# Patient Record
Sex: MALE | Race: WHITE | Employment: FULL TIME | URBAN - METROPOLITAN AREA
[De-identification: names, ages, dates, MRNs, and addresses within clinical notes are randomized per-mention and may not be internally consistent; named-entity substitution may affect disease eponyms.]

---

## 2024-06-07 ENCOUNTER — OFFICE VISIT (OUTPATIENT)
Dept: SURGERY | Age: 69
End: 2024-06-07
Payer: MEDICARE

## 2024-06-07 VITALS
HEART RATE: 62 BPM | WEIGHT: 200 LBS | HEIGHT: 70 IN | BODY MASS INDEX: 28.63 KG/M2 | OXYGEN SATURATION: 90 % | TEMPERATURE: 97.9 F

## 2024-06-07 DIAGNOSIS — D04.5 SQUAMOUS CELL CARCINOMA IN SITU (SCCIS) OF SKIN OF BACK: Primary | ICD-10-CM

## 2024-06-07 PROCEDURE — 1123F ACP DISCUSS/DSCN MKR DOCD: CPT | Performed by: PLASTIC SURGERY

## 2024-06-07 PROCEDURE — 1036F TOBACCO NON-USER: CPT | Performed by: PLASTIC SURGERY

## 2024-06-07 PROCEDURE — G8419 CALC BMI OUT NRM PARAM NOF/U: HCPCS | Performed by: PLASTIC SURGERY

## 2024-06-07 PROCEDURE — 3017F COLORECTAL CA SCREEN DOC REV: CPT | Performed by: PLASTIC SURGERY

## 2024-06-07 PROCEDURE — G8427 DOCREV CUR MEDS BY ELIG CLIN: HCPCS | Performed by: PLASTIC SURGERY

## 2024-06-07 PROCEDURE — 99204 OFFICE O/P NEW MOD 45 MIN: CPT | Performed by: PLASTIC SURGERY

## 2024-06-07 NOTE — PROGRESS NOTES
results found for: \"NA\", \"K\", \"CL\", \"CO2\", \"BUN\", \"LABALBU\", \"CREATININE\", \"CALCIUM\", \"GFRAA\", \"LABGLOM\", \"GLUCOSE\", \"GLU\"    Radiology Review:  No radiology needed at this time  Pathology Review:  Pathology reviewed                 IMPRESSION/RECOMMENDATIONS:        Diagnosis  -) Squamous Cell Cancer of mid back      -The patient was counseled on their pathology results and the need for surgical   intervention.   -We will plan to proceed and have the lesion formely excised with margins in office under local anesthesia.  -The patient will not require frozen sections in the operating room  -The patient will not require pre-op clearance from their PCP.    -Excision of mid back squamous Cell Cancer with possible local tissue rearrangement      -The risks, benefits and options were discussed with the pt. The risks included but not limited to pain, bleeding, infection, heavy scarring, damage to surrounding structures, fluid collections, asymmetry, and need for further procedures. All of His questions were answered to their satisfaction and He agrees to proceed with the procedure.    Photos obtained    Follow up day of procedure.       This document is generated, in part, by voice recognition software and thus  syntax and grammatical errors are possible.    Clark Hermosillo MD  12:26 PM  6/18/2024

## 2024-06-20 ENCOUNTER — PROCEDURE VISIT (OUTPATIENT)
Dept: SURGERY | Age: 69
End: 2024-06-20
Payer: MEDICARE

## 2024-06-20 VITALS
SYSTOLIC BLOOD PRESSURE: 120 MMHG | TEMPERATURE: 98.1 F | DIASTOLIC BLOOD PRESSURE: 76 MMHG | HEART RATE: 87 BPM | RESPIRATION RATE: 20 BRPM | OXYGEN SATURATION: 96 %

## 2024-06-20 DIAGNOSIS — C44.92 SCC (SQUAMOUS CELL CARCINOMA): Primary | ICD-10-CM

## 2024-06-20 PROCEDURE — 12032 INTMD RPR S/A/T/EXT 2.6-7.5: CPT | Performed by: PLASTIC SURGERY

## 2024-06-20 PROCEDURE — 11602 EXC TR-EXT MAL+MARG 1.1-2 CM: CPT | Performed by: PLASTIC SURGERY

## 2024-06-20 RX ORDER — LIDOCAINE HYDROCHLORIDE AND EPINEPHRINE 10; 10 MG/ML; UG/ML
1 INJECTION, SOLUTION INFILTRATION; PERINEURAL ONCE
Status: COMPLETED | OUTPATIENT
Start: 2024-06-20 | End: 2024-06-20

## 2024-06-20 RX ADMIN — LIDOCAINE HYDROCHLORIDE AND EPINEPHRINE 1 ML: 10; 10 INJECTION, SOLUTION INFILTRATION; PERINEURAL at 09:47

## 2024-06-20 NOTE — PROGRESS NOTES
Subjective:    Follow up today for excision of squamous cell carcinoma mid back. Denies fever, nausea, vomiting, leg pain or swelling.  The patient voices understanding of the procedure they are having today and would like to proceed.  Patient states that the mass has been painful and growing.    Objective:    /76 (Site: Left Upper Arm, Position: Sitting, Cuff Size: Medium Adult)   Pulse 87   Temp 98.1 °F (36.7 °C) (Infrared)   Resp 20   SpO2 96%       Squamous cell carcinoma mid back  Lesion- 12mm x 12mm  Margin- 4mm  Defect- 20mm x 20mm  Scar-50mm         Assessment:    There is no problem list on file for this patient.      Plan:       Diagnosis  -) Squamous cell carcinoma mid back  -) Pain    -The risks, benefits and options were discussed with the pt. The risks included but not limited to pain, bleeding, infection, heavy scarring, damage to surrounding structures, fluid collections, asymmetry, and need for further procedures. All of His questions were answered to their satisfaction and He agrees to proceed with the procedure.      -After consent was obtained, the area was cleansed with an alcohol swab. Local anesthesia consisting of 1% lidocaine with 1:100,000 epinepherine was injected  surrounding the area. The local was allowed to work.  Using a 10-blade the Squamous cell carcinoma mid back  was excised,  intermediate  repair was performed.  The wound(s) were closed with 3-0 vicryl and a running locking 3-0 prolene hemostasis was obtained and a bacitracin and gauze dressing was placed over the wound.  The procedure was performed by Dr Clark Hermosillo     Please schedule an appointment to be seen on Follow-up with our office in 7-14 days  Diet: regular diet  Activity: no heavy lifting for 7 days.   Shower/Bathing: OK to shower over the wound site in 48 hours.  No baths, hot tubs or soaking of the wound site at this time.  Pt voices understanding.     Wound care:   Bacitracin will need to be placed over the

## 2024-07-01 ENCOUNTER — OFFICE VISIT (OUTPATIENT)
Dept: SURGERY | Age: 69
End: 2024-07-01

## 2024-07-01 DIAGNOSIS — C44.92 SCC (SQUAMOUS CELL CARCINOMA): Primary | ICD-10-CM

## 2024-07-01 LAB — SURGICAL PATHOLOGY REPORT: NORMAL

## 2024-07-01 PROCEDURE — 99024 POSTOP FOLLOW-UP VISIT: CPT | Performed by: PHYSICIAN ASSISTANT

## 2024-07-01 NOTE — PROGRESS NOTES
the scar and surrounding skin should react equally to sun.   Superficial Scar Massage  Scar massage desensitizes and reduces scar adhesions so skin glides freely.   Rub in a circular motion on and around the scar with firm, even pressure for 5 minutes four times per day   You can start scar massage once incision is completely healed and strong enough to handle the motion (usually 10 - 14 days post operatively).   You use lotion to do the scar massage to allow ease with motion over the scar and prevent friction at the area.     Patient had no further questions.      F/U PRN  Call office with concerns or signs of infection.    MEL Gordon   10:28 AM  7/1/2024

## 2024-07-12 ENCOUNTER — SCHEDULED TELEPHONE ENCOUNTER (OUTPATIENT)
Dept: SURGERY | Age: 69
End: 2024-07-12

## 2024-07-12 DIAGNOSIS — C44.92 SCC (SQUAMOUS CELL CARCINOMA): Primary | ICD-10-CM

## 2024-07-12 NOTE — PROGRESS NOTES
Wang Corcoran Jr is a 69 y.o. male evaluated via telephone on 7/12/2024.      Consent:  He and/or health care decision maker is aware that that he may receive a bill for this telephone service, depending on his insurance coverage, and has provided verbal consent to proceed: Yes    The patient was in the state of [place] during the entirety of the phone conversation.      Documentation:  I communicated with the patients wife namita about the pathology results from their husbands most recent biopsy.   Details of this discussion including any medical advice provided:       I spoke with the patient's wife Namita.  She stated that she would translate these biopsy results pathology to her  as he was not available to come to the phone at this time.  She understood that the pathology is clear to no remaining squamous cell carcinoma is present he will continue to follow-up with dermatology as directed.    Pathology Path Number: [id number]     -- Diagnosis --   Skin of mid back, excision: Cicatrix with focal osseous metaplasia   Seborrheic keratosis and actinic change   Negative for malignancy       I affirm this is a Patient Initiated Episode with a Patient who has not had a related appointment within my department in the past 7 days or scheduled within the next 24 hours.    Patient identification was verified at the start of the visit: Yes    Total Time: minutes: <5 minutes (not billable)    The visit was conducted pursuant to the emergency declaration under the Diaz Act and the National Emergencies Act, 1135 waiver authority and the Coronavirus Preparedness and Response Supplemental Appropriations Act.  Patient identification was verified, and a caregiver was present when appropriate. The patient was located in a state where the provider was credentialed to provide care.    Note: not billable if this call serves to triage the patient into an appointment for the relevant concern      MEL Gordon